# Patient Record
Sex: FEMALE | Race: ASIAN | NOT HISPANIC OR LATINO
[De-identification: names, ages, dates, MRNs, and addresses within clinical notes are randomized per-mention and may not be internally consistent; named-entity substitution may affect disease eponyms.]

---

## 2019-04-18 ENCOUNTER — RESULT REVIEW (OUTPATIENT)
Age: 34
End: 2019-04-18

## 2019-04-23 ENCOUNTER — TRANSCRIPTION ENCOUNTER (OUTPATIENT)
Age: 34
End: 2019-04-23

## 2020-05-07 ENCOUNTER — APPOINTMENT (OUTPATIENT)
Dept: OTOLARYNGOLOGY | Facility: CLINIC | Age: 35
End: 2020-05-07
Payer: COMMERCIAL

## 2020-05-07 VITALS
DIASTOLIC BLOOD PRESSURE: 78 MMHG | HEART RATE: 71 BPM | HEIGHT: 62 IN | WEIGHT: 120 LBS | BODY MASS INDEX: 22.08 KG/M2 | SYSTOLIC BLOOD PRESSURE: 114 MMHG

## 2020-05-07 DIAGNOSIS — Z78.9 OTHER SPECIFIED HEALTH STATUS: ICD-10-CM

## 2020-05-07 DIAGNOSIS — R09.89 OTHER SPECIFIED SYMPTOMS AND SIGNS INVOLVING THE CIRCULATORY AND RESPIRATORY SYSTEMS: ICD-10-CM

## 2020-05-07 PROCEDURE — 99204 OFFICE O/P NEW MOD 45 MIN: CPT | Mod: 25

## 2020-05-07 PROCEDURE — 42809 REMOVE PHARYNX FOREIGN BODY: CPT | Mod: RT

## 2020-05-07 NOTE — END OF VISIT
[FreeTextEntry3] : I personally saw and examined GIO DAVIS in detail.  I spoke to QUETA Marcano regarding the assessment and plan of care. I performed the procedures and relevant physical exam.  I have reviewed the above assessment and plan of care and I agree.  I have made changes to the body of the note wherever necessary and appropriate.

## 2020-05-07 NOTE — HISTORY OF PRESENT ILLNESS
[de-identified] : 33 y/o F, was eating sea bass today around 12:00pm and felt a bone get stuck on the right side of throat.  It has not moved.  Pos discomfort and odynophagia.  No SOB, no drooling.  Has not tried to eat or drink.

## 2020-05-07 NOTE — DATA REVIEWED
You may be receiving a patient experience survey by mail or e-mail from the Urgent Care Staff regarding your visit today.  We value your feedback and hope you can provide us your insight.    Patient Education     Tendonitis  A tendon is the thick fibrous cord that joins muscle to bone and allows joints to move. When a tendon becomes inflamed, it is called tendonitis. This can occur from overuse, injury, or infection. This usually involves the shoulders, forearm, wrist, hands and foot. Symptoms include pain, swelling and tenderness to the touch. Moving the joint increases the pain.  It takes 4 to 6 weeks for tendonitis to heal. It is treated by preventing motion of the tendon with a splint or brace and the use of anti-inflammatory medicine.  Home care  · Some people find relief with ice packs. These can be crushed or cubed ice in a plastic bag or a bag of frozen vegetables wrapped in a thin towel. Other people get better relief with heat. This can include a hot shower, hot bath, or a moist towel warmed in a microwave. Try each and use the method that feels best, for 15 to 20 minutes several times a day.  · Rest the inflamed joint and protect it from movement.  · You may use over-the-counter ibuprofen or naproxen to treat pain and inflammation, unless another medicine was prescribed. If you can't take these medicines, acetaminophen may help with the pain, but does not treat inflammation. If you have chronic liver or kidney disease or ever had a stomach ulcer or gastrointestinal bleeding, talk with your doctor before using these medicines.  · As your symptoms improve, begin gradual motion at the involved joint.  Follow-up care  Follow up with your healthcare provider if you are not improving after 5 days of treatment.  When to seek medical advice  Call your healthcare provider right away if any of these occur:  · Redness over the painful area  · Increasing pain or swelling at the joint  · Fever (1 degree above your  [de-identified] : xray reviewed from [facility]: no fb seen.  normal temperature) lasting 24 to 48 hours Or, whatever your healthcare provider told you to report based on your condition  © 7554-0483 [employer]. [address] [place] [address], [place] [address]. All rights reserved. This information is not intended as a substitute for professional medical care. Always follow your healthcare professional's instructions.         I would encourage you to use Aleve tablets (generic name is naproxen) he'll contact one or 2 tablets twice a day. This is available without a prescription. Patient you can use acetaminophen (Tylenol) 1000 mg 3-4 times daily.  If you have persistent symptoms or develop additional symptoms please see her primary care and follow-up

## 2020-05-07 NOTE — ASSESSMENT
[FreeTextEntry1] : Ms. DAVIS is a 34 year female with right tonsil foreign body (fish bone), removed in the office.  Much improved. \par - liquids only until cetacaine wears off. then soft diet for a few days\par - f/up as needed, call if there is any sensation of retained foreign body.

## 2020-05-07 NOTE — PHYSICAL EXAM
[Midline] : trachea located in midline position [Normal] : no rashes [de-identified] : area of trauma to right middle tonsil, when probed with q tip, edge of fish bone seen.

## 2020-05-07 NOTE — PROCEDURE
[FreeTextEntry1] : removal foreign body right tonsil [FreeTextEntry2] : retained fish bone right tonsil  [FreeTextEntry3] : risks, benefits and alternatives explained to the patient.  they agreed to proceed with procedure.  Consent signed.  Cetacaine used to spray right tonsil.  Area of previous trauma identified and probed with q tip, edge of fishbone seen.  Tongue depressor used to retract tongue and alligator forceps used to remove fish bone from the right tonsil. Minimal trauma noted.  patient with immediate relief.  No significant bleeding.

## 2020-06-16 ENCOUNTER — RESULT REVIEW (OUTPATIENT)
Age: 35
End: 2020-06-16

## 2021-06-21 ENCOUNTER — APPOINTMENT (OUTPATIENT)
Dept: ANTEPARTUM | Facility: CLINIC | Age: 36
End: 2021-06-21
Payer: COMMERCIAL

## 2021-06-21 ENCOUNTER — ASOB RESULT (OUTPATIENT)
Age: 36
End: 2021-06-21

## 2021-06-21 PROCEDURE — 99072 ADDL SUPL MATRL&STAF TM PHE: CPT

## 2021-06-21 PROCEDURE — 76813 OB US NUCHAL MEAS 1 GEST: CPT

## 2021-06-21 PROCEDURE — 76801 OB US < 14 WKS SINGLE FETUS: CPT

## 2021-08-17 ENCOUNTER — ASOB RESULT (OUTPATIENT)
Age: 36
End: 2021-08-17

## 2021-08-17 ENCOUNTER — TRANSCRIPTION ENCOUNTER (OUTPATIENT)
Age: 36
End: 2021-08-17

## 2021-08-17 ENCOUNTER — APPOINTMENT (OUTPATIENT)
Dept: ANTEPARTUM | Facility: CLINIC | Age: 36
End: 2021-08-17
Payer: COMMERCIAL

## 2021-08-17 ENCOUNTER — APPOINTMENT (OUTPATIENT)
Dept: PEDIATRIC CARDIOLOGY | Facility: CLINIC | Age: 36
End: 2021-08-17
Payer: COMMERCIAL

## 2021-08-17 PROCEDURE — 93325 DOPPLER ECHO COLOR FLOW MAPG: CPT | Mod: 59

## 2021-08-17 PROCEDURE — 76811 OB US DETAILED SNGL FETUS: CPT

## 2021-08-17 PROCEDURE — 99202 OFFICE O/P NEW SF 15 MIN: CPT

## 2021-08-17 PROCEDURE — 76827 ECHO EXAM OF FETAL HEART: CPT

## 2021-08-17 PROCEDURE — 76820 UMBILICAL ARTERY ECHO: CPT

## 2021-08-17 PROCEDURE — 76821 MIDDLE CEREBRAL ARTERY ECHO: CPT

## 2021-08-17 PROCEDURE — 76825 ECHO EXAM OF FETAL HEART: CPT

## 2021-09-21 ENCOUNTER — OUTPATIENT (OUTPATIENT)
Dept: OUTPATIENT SERVICES | Facility: HOSPITAL | Age: 36
LOS: 1 days | End: 2021-09-21
Payer: COMMERCIAL

## 2021-09-21 VITALS
DIASTOLIC BLOOD PRESSURE: 61 MMHG | HEART RATE: 81 BPM | RESPIRATION RATE: 15 BRPM | SYSTOLIC BLOOD PRESSURE: 106 MMHG | TEMPERATURE: 98 F

## 2021-09-21 VITALS — HEART RATE: 81 BPM | SYSTOLIC BLOOD PRESSURE: 106 MMHG | DIASTOLIC BLOOD PRESSURE: 61 MMHG

## 2021-09-21 VITALS — SYSTOLIC BLOOD PRESSURE: 111 MMHG | HEART RATE: 77 BPM | DIASTOLIC BLOOD PRESSURE: 67 MMHG

## 2021-09-21 DIAGNOSIS — Z3A.00 WEEKS OF GESTATION OF PREGNANCY NOT SPECIFIED: ICD-10-CM

## 2021-09-21 DIAGNOSIS — O26.899 OTHER SPECIFIED PREGNANCY RELATED CONDITIONS, UNSPECIFIED TRIMESTER: ICD-10-CM

## 2021-09-21 PROCEDURE — G0463: CPT

## 2021-09-21 PROCEDURE — 59025 FETAL NON-STRESS TEST: CPT

## 2021-09-21 NOTE — OB RN TRIAGE NOTE - NS_TRIAGEADDITIONAL COMMENTS_OBGYN_ALL_OB_FT
pt stated she has been up the last 3 nights due to insomnia  and noted pain " all over " and dec FM last night   pt notes that she feels less cramping but notes a dec in FM

## 2021-09-21 NOTE — OB PROVIDER TRIAGE NOTE - NSOBPROVIDERNOTE_OBGYN_ALL_OB_FT
37yo  @25w4d presents for evaluation for dec. FM.   - BPP , NST reactive  - d/c home  - kick precautions reviewed  - PTL precautions reviewed  - resume routine prenatal care    D/W Dr. Chris Valdivia, PGY-2 35yo  @25w4d presents for evaluation for dec. FM.   - BPP , NST reactive  - d/c home  - kick precautions reviewed  - PTL precautions reviewed  - resume routine prenatal care    D/W Dr. Chris Valdivia, PGY-2      attending addendum  Patient seen at bedside. No abdominal pain on exam. Patient is feeling baby move. Discussed measures to help her with sleep. Patient is in process of getting an appointment with  psych at [facility], but awaiting a call back. Patient stable for discharge home and to follow up in a week in the office    LETICIA Perez MD

## 2021-09-21 NOTE — OB PROVIDER TRIAGE NOTE - HISTORY OF PRESENT ILLNESS
35yo  @25w4d presents for evaluation for dec. FM. Patient reports dec. Fm since this AM and LUQ pain since last night. Patient has had insomnia for the past 3 days, and dec. appetite 2/2 lack of sleep. She reports she feels more movement now.   +FM. -VB. -LOF. -Ctx.    OBHx:   - PNC uncomplicated  - IVF pregnancy, transfer in April  GynHx: denies hx of fibroids/STDs/ovarian cysts/abnml pap smears  PMHx: denies  SHx: denies  Meds: PNVs, Vit C  All: NKDA

## 2021-09-24 PROBLEM — G47.00 INSOMNIA, UNSPECIFIED: Chronic | Status: ACTIVE | Noted: 2021-09-21

## 2021-10-19 ENCOUNTER — NON-APPOINTMENT (OUTPATIENT)
Age: 36
End: 2021-10-19

## 2021-10-20 ENCOUNTER — ASOB RESULT (OUTPATIENT)
Age: 36
End: 2021-10-20

## 2021-10-20 ENCOUNTER — APPOINTMENT (OUTPATIENT)
Dept: MATERNAL FETAL MEDICINE | Facility: CLINIC | Age: 36
End: 2021-10-20
Payer: COMMERCIAL

## 2021-10-20 DIAGNOSIS — O24.419 GESTATIONAL DIABETES MELLITUS IN PREGNANCY, UNSPECIFIED CONTROL: ICD-10-CM

## 2021-10-20 PROCEDURE — G0109 DIAB MANAGE TRN IND/GROUP: CPT | Mod: 95

## 2021-10-20 RX ORDER — LANCETS 33 GAUGE
EACH MISCELLANEOUS
Qty: 4 | Refills: 1 | Status: ACTIVE | COMMUNITY
Start: 2021-10-20 | End: 1900-01-01

## 2021-10-20 RX ORDER — URINE ACETONE TEST STRIPS
STRIP MISCELLANEOUS
Qty: 1 | Refills: 0 | Status: ACTIVE | COMMUNITY
Start: 2021-10-20 | End: 1900-01-01

## 2021-10-20 RX ORDER — BLOOD-GLUCOSE METER
W/DEVICE KIT MISCELLANEOUS
Qty: 1 | Refills: 0 | Status: ACTIVE | COMMUNITY
Start: 2021-10-20 | End: 1900-01-01

## 2021-10-20 RX ORDER — BLOOD-GLUCOSE METER
KIT MISCELLANEOUS 4 TIMES DAILY
Qty: 4 | Refills: 1 | Status: ACTIVE | COMMUNITY
Start: 2021-10-20 | End: 1900-01-01

## 2021-10-22 ENCOUNTER — NON-APPOINTMENT (OUTPATIENT)
Age: 36
End: 2021-10-22

## 2021-10-25 ENCOUNTER — APPOINTMENT (OUTPATIENT)
Dept: MATERNAL FETAL MEDICINE | Facility: CLINIC | Age: 36
End: 2021-10-25

## 2021-10-28 ENCOUNTER — ASOB RESULT (OUTPATIENT)
Age: 36
End: 2021-10-28

## 2021-10-28 ENCOUNTER — APPOINTMENT (OUTPATIENT)
Dept: ANTEPARTUM | Facility: CLINIC | Age: 36
End: 2021-10-28
Payer: COMMERCIAL

## 2021-10-28 PROCEDURE — 76820 UMBILICAL ARTERY ECHO: CPT

## 2021-10-28 PROCEDURE — 76819 FETAL BIOPHYS PROFIL W/O NST: CPT

## 2021-10-28 PROCEDURE — 76816 OB US FOLLOW-UP PER FETUS: CPT

## 2021-10-29 ENCOUNTER — APPOINTMENT (OUTPATIENT)
Dept: ANTEPARTUM | Facility: CLINIC | Age: 36
End: 2021-10-29

## 2021-10-29 ENCOUNTER — APPOINTMENT (OUTPATIENT)
Dept: MATERNAL FETAL MEDICINE | Facility: CLINIC | Age: 36
End: 2021-10-29
Payer: COMMERCIAL

## 2021-10-29 ENCOUNTER — ASOB RESULT (OUTPATIENT)
Age: 36
End: 2021-10-29

## 2021-10-29 PROCEDURE — G0108 DIAB MANAGE TRN  PER INDIV: CPT | Mod: 95

## 2021-11-12 ENCOUNTER — APPOINTMENT (OUTPATIENT)
Dept: MATERNAL FETAL MEDICINE | Facility: CLINIC | Age: 36
End: 2021-11-12
Payer: COMMERCIAL

## 2021-11-12 ENCOUNTER — ASOB RESULT (OUTPATIENT)
Age: 36
End: 2021-11-12

## 2021-11-12 PROCEDURE — G0108 DIAB MANAGE TRN  PER INDIV: CPT | Mod: 95

## 2021-11-22 ENCOUNTER — ASOB RESULT (OUTPATIENT)
Age: 36
End: 2021-11-22

## 2021-11-22 ENCOUNTER — APPOINTMENT (OUTPATIENT)
Dept: ANTEPARTUM | Facility: CLINIC | Age: 36
End: 2021-11-22
Payer: COMMERCIAL

## 2021-11-22 PROCEDURE — 76816 OB US FOLLOW-UP PER FETUS: CPT

## 2021-12-03 ENCOUNTER — APPOINTMENT (OUTPATIENT)
Dept: MATERNAL FETAL MEDICINE | Facility: CLINIC | Age: 36
End: 2021-12-03
Payer: COMMERCIAL

## 2021-12-03 ENCOUNTER — ASOB RESULT (OUTPATIENT)
Age: 36
End: 2021-12-03

## 2021-12-03 PROCEDURE — G0108 DIAB MANAGE TRN  PER INDIV: CPT | Mod: 95

## 2021-12-06 ENCOUNTER — APPOINTMENT (OUTPATIENT)
Dept: ANTEPARTUM | Facility: CLINIC | Age: 36
End: 2021-12-06
Payer: COMMERCIAL

## 2021-12-06 ENCOUNTER — ASOB RESULT (OUTPATIENT)
Age: 36
End: 2021-12-06

## 2021-12-06 PROCEDURE — 76819 FETAL BIOPHYS PROFIL W/O NST: CPT

## 2021-12-20 ENCOUNTER — APPOINTMENT (OUTPATIENT)
Dept: ANTEPARTUM | Facility: CLINIC | Age: 36
End: 2021-12-20
Payer: COMMERCIAL

## 2021-12-20 ENCOUNTER — ASOB RESULT (OUTPATIENT)
Age: 36
End: 2021-12-20

## 2021-12-20 PROCEDURE — 76816 OB US FOLLOW-UP PER FETUS: CPT

## 2021-12-20 PROCEDURE — 76819 FETAL BIOPHYS PROFIL W/O NST: CPT

## 2021-12-24 ENCOUNTER — OUTPATIENT (OUTPATIENT)
Dept: OUTPATIENT SERVICES | Facility: HOSPITAL | Age: 36
LOS: 1 days | End: 2021-12-24
Payer: COMMERCIAL

## 2021-12-24 VITALS
DIASTOLIC BLOOD PRESSURE: 60 MMHG | RESPIRATION RATE: 19 BRPM | OXYGEN SATURATION: 89 % | HEART RATE: 81 BPM | TEMPERATURE: 98 F | SYSTOLIC BLOOD PRESSURE: 115 MMHG

## 2021-12-24 DIAGNOSIS — Z3A.00 WEEKS OF GESTATION OF PREGNANCY NOT SPECIFIED: ICD-10-CM

## 2021-12-24 DIAGNOSIS — O26.899 OTHER SPECIFIED PREGNANCY RELATED CONDITIONS, UNSPECIFIED TRIMESTER: ICD-10-CM

## 2021-12-24 PROCEDURE — 76818 FETAL BIOPHYS PROFILE W/NST: CPT | Mod: 26

## 2021-12-24 PROCEDURE — 59025 FETAL NON-STRESS TEST: CPT

## 2021-12-24 PROCEDURE — G0463: CPT

## 2021-12-24 PROCEDURE — 99213 OFFICE O/P EST LOW 20 MIN: CPT

## 2021-12-24 NOTE — OB RN TRIAGE NOTE - NS_OBACUITY_OBGYN_ALL_OB_DT
JAD KUHN  : 1957 09:43:00  ACCOUNT:  [id number]  HOME PHONE:  [phone]  WORK PHONE:  [phone] CBC reviewed: Hgb 9.8, Hct 27.8 which is an improvement from hospital discharge.  Message left on the patient's cell phone.    Vanessa LAIRD     24-Dec-2021 15:59

## 2021-12-24 NOTE — OB PROVIDER TRIAGE NOTE - NSHPPHYSICALEXAM_GEN_ALL_CORE
T(C): 36.7 (12-24-21 @ 16:03), Max: 36.7 (12-24-21 @ 15:57)  HR: 84 (12-24-21 @ 16:47) (82 - 90)  BP: 120/73 (12-24-21 @ 16:05) (120/73 - 120/73)  RR: 19 (12-24-21 @ 16:05) (19 - 19)  SpO2: 94% (12-24-21 @ 16:47) (93% - 97%)  GEN:NAD  CV:RRR  Pulm: CTA bl  Abd soft NT gravid  FHT:   130  , mod john, + accels, - decels   TOCO:  no ctx  VE: deferred  SONO vtx BPP 8/8 abdirahman 12 anterior placenta

## 2021-12-24 NOTE — OB PROVIDER TRIAGE NOTE - NSOBPROVIDERNOTE_OBGYN_ALL_OB_FT
AP 35yo  @ 39weeks with decreased fetal movement which resolved, reactive NST  -efm/toco  -BPP done  -dc home  -appointment Monday  DW Dr Jarrod Jiménez PAC

## 2021-12-24 NOTE — OB RN TRIAGE NOTE - FALL HARM RISK - UNIVERSAL INTERVENTIONS
Spine appears normal, range of motion is not limited, no muscle or joint tenderness
Bed in lowest position, wheels locked, appropriate side rails in place/Call bell, personal items and telephone in reach/Instruct patient to call for assistance before getting out of bed or chair/Non-slip footwear when patient is out of bed/[place] to call system/Physically safe environment - no spills, clutter or unnecessary equipment/Purposeful Proactive Rounding/Room/bathroom lighting operational, light cord in reach

## 2021-12-24 NOTE — OB PROVIDER TRIAGE NOTE - HISTORY OF PRESENT ILLNESS
35yo  EDC 21 @ 39w reports decreased FM since earlier today. Pt states she currently feels "normal" movement. Denies ctx  lof  vb    GBS  negative  EFW 3200  PNC C/b GDMA1, fasting FS 60's, IVF pregnancy    OB:   GYN:Denies fibroids/ovarian cysts/STI's/abnl pap  PMH: none  PSH: none  MEDS: PNV  ALL:NKDA  Accepts blood. Denies h/o anxiety/depression.

## 2021-12-30 ENCOUNTER — ASOB RESULT (OUTPATIENT)
Age: 36
End: 2021-12-30

## 2021-12-30 ENCOUNTER — APPOINTMENT (OUTPATIENT)
Dept: ANTEPARTUM | Facility: CLINIC | Age: 36
End: 2021-12-30
Payer: COMMERCIAL

## 2021-12-30 PROBLEM — O24.419 GESTATIONAL DIABETES MELLITUS IN PREGNANCY, UNSPECIFIED CONTROL: Chronic | Status: ACTIVE | Noted: 2021-12-24

## 2021-12-30 PROCEDURE — 76819 FETAL BIOPHYS PROFIL W/O NST: CPT

## 2022-01-03 ENCOUNTER — ASOB RESULT (OUTPATIENT)
Age: 37
End: 2022-01-03

## 2022-01-03 ENCOUNTER — APPOINTMENT (OUTPATIENT)
Dept: ANTEPARTUM | Facility: CLINIC | Age: 37
End: 2022-01-03
Payer: COMMERCIAL

## 2022-01-03 PROCEDURE — 76819 FETAL BIOPHYS PROFIL W/O NST: CPT

## 2022-01-05 ENCOUNTER — INPATIENT (INPATIENT)
Facility: HOSPITAL | Age: 37
LOS: 2 days | Discharge: HOME | End: 2022-01-08
Attending: OBSTETRICS & GYNECOLOGY | Admitting: OBSTETRICS & GYNECOLOGY
Payer: COMMERCIAL

## 2022-01-05 ENCOUNTER — OUTPATIENT (OUTPATIENT)
Dept: OUTPATIENT SERVICES | Facility: HOSPITAL | Age: 37
LOS: 1 days | End: 2022-01-05
Payer: COMMERCIAL

## 2022-01-05 VITALS — TEMPERATURE: 99 F

## 2022-01-05 DIAGNOSIS — Z11.52 ENCOUNTER FOR SCREENING FOR COVID-19: ICD-10-CM

## 2022-01-05 DIAGNOSIS — N93.9 ABNORMAL UTERINE AND VAGINAL BLEEDING, UNSPECIFIED: ICD-10-CM

## 2022-01-05 DIAGNOSIS — O26.899 OTHER SPECIFIED PREGNANCY RELATED CONDITIONS, UNSPECIFIED TRIMESTER: ICD-10-CM

## 2022-01-05 DIAGNOSIS — Z34.80 ENCOUNTER FOR SUPERVISION OF OTHER NORMAL PREGNANCY, UNSPECIFIED TRIMESTER: ICD-10-CM

## 2022-01-05 DIAGNOSIS — Z3A.00 WEEKS OF GESTATION OF PREGNANCY NOT SPECIFIED: ICD-10-CM

## 2022-01-05 LAB
BASOPHILS # BLD AUTO: 0.03 K/UL — SIGNIFICANT CHANGE UP (ref 0–0.2)
BASOPHILS NFR BLD AUTO: 0.3 % — SIGNIFICANT CHANGE UP (ref 0–2)
BLD GP AB SCN SERPL QL: NEGATIVE — SIGNIFICANT CHANGE UP
EOSINOPHIL # BLD AUTO: 0.08 K/UL — SIGNIFICANT CHANGE UP (ref 0–0.5)
EOSINOPHIL NFR BLD AUTO: 0.9 % — SIGNIFICANT CHANGE UP (ref 0–6)
GLUCOSE BLDC GLUCOMTR-MCNC: 104 MG/DL — HIGH (ref 70–99)
GLUCOSE BLDC GLUCOMTR-MCNC: 81 MG/DL — SIGNIFICANT CHANGE UP (ref 70–99)
GLUCOSE BLDC GLUCOMTR-MCNC: 87 MG/DL — SIGNIFICANT CHANGE UP (ref 70–99)
HCT VFR BLD CALC: 37 % — SIGNIFICANT CHANGE UP (ref 34.5–45)
HGB BLD-MCNC: 12.7 G/DL — SIGNIFICANT CHANGE UP (ref 11.5–15.5)
IMM GRANULOCYTES NFR BLD AUTO: 0.4 % — SIGNIFICANT CHANGE UP (ref 0–1.5)
LYMPHOCYTES # BLD AUTO: 1.97 K/UL — SIGNIFICANT CHANGE UP (ref 1–3.3)
LYMPHOCYTES # BLD AUTO: 21.4 % — SIGNIFICANT CHANGE UP (ref 13–44)
MCHC RBC-ENTMCNC: 34.1 PG — HIGH (ref 27–34)
MCHC RBC-ENTMCNC: 34.3 GM/DL — SIGNIFICANT CHANGE UP (ref 32–36)
MCV RBC AUTO: 99.5 FL — SIGNIFICANT CHANGE UP (ref 80–100)
MONOCYTES # BLD AUTO: 0.51 K/UL — SIGNIFICANT CHANGE UP (ref 0–0.9)
MONOCYTES NFR BLD AUTO: 5.5 % — SIGNIFICANT CHANGE UP (ref 2–14)
NEUTROPHILS # BLD AUTO: 6.56 K/UL — SIGNIFICANT CHANGE UP (ref 1.8–7.4)
NEUTROPHILS NFR BLD AUTO: 71.5 % — SIGNIFICANT CHANGE UP (ref 43–77)
NRBC # BLD: 0 /100 WBCS — SIGNIFICANT CHANGE UP (ref 0–0)
PLATELET # BLD AUTO: 171 K/UL — SIGNIFICANT CHANGE UP (ref 150–400)
RBC # BLD: 3.72 M/UL — LOW (ref 3.8–5.2)
RBC # FLD: 12.1 % — SIGNIFICANT CHANGE UP (ref 10.3–14.5)
RH IG SCN BLD-IMP: POSITIVE — SIGNIFICANT CHANGE UP
SARS-COV-2 RNA SPEC QL NAA+PROBE: SIGNIFICANT CHANGE UP
T PALLIDUM AB TITR SER: NEGATIVE — SIGNIFICANT CHANGE UP
WBC # BLD: 9.19 K/UL — SIGNIFICANT CHANGE UP (ref 3.8–10.5)
WBC # FLD AUTO: 9.19 K/UL — SIGNIFICANT CHANGE UP (ref 3.8–10.5)

## 2022-01-05 PROCEDURE — C9803: CPT

## 2022-01-05 PROCEDURE — U0005: CPT

## 2022-01-05 PROCEDURE — U0003: CPT

## 2022-01-05 RX ORDER — CITRIC ACID/SODIUM CITRATE 300-500 MG
15 SOLUTION, ORAL ORAL EVERY 6 HOURS
Refills: 0 | Status: DISCONTINUED | OUTPATIENT
Start: 2022-01-05 | End: 2022-01-06

## 2022-01-05 RX ORDER — SODIUM CHLORIDE 9 MG/ML
1000 INJECTION, SOLUTION INTRAVENOUS
Refills: 0 | Status: DISCONTINUED | OUTPATIENT
Start: 2022-01-05 | End: 2022-01-05

## 2022-01-05 RX ORDER — SODIUM CHLORIDE 9 MG/ML
1000 INJECTION INTRAMUSCULAR; INTRAVENOUS; SUBCUTANEOUS
Refills: 0 | Status: DISCONTINUED | OUTPATIENT
Start: 2022-01-05 | End: 2022-01-08

## 2022-01-05 RX ORDER — OXYTOCIN 10 UNIT/ML
333.33 VIAL (ML) INJECTION
Qty: 20 | Refills: 0 | Status: DISCONTINUED | OUTPATIENT
Start: 2022-01-05 | End: 2022-01-08

## 2022-01-05 RX ORDER — SODIUM CHLORIDE 9 MG/ML
1000 INJECTION, SOLUTION INTRAVENOUS
Refills: 0 | Status: DISCONTINUED | OUTPATIENT
Start: 2022-01-05 | End: 2022-01-06

## 2022-01-05 NOTE — OB PROVIDER H&P - PROBLEM SELECTOR PLAN 1
-Admit  -Draw basic labs   -Sonogram to be performed  -Clear liquid diet   -IVF  -Buccal Cytotec, Bicitra, Pitocin infusion   -Cervical balloon  -FHR monitoring, uterine monitoring    COLT Brewer -Admit  -Draw basic labs   -Clear liquid diet   -IVF  -Buccal Cytotec, Bicitra   -Cervical balloon after few doses.   -FHR monitoring, uterine monitoring    JINNY Brewer-S    Agree w/ above. Pt in early labor vs. possible abruption though suspicion low given benign abdominal exam and FH  R cat 1. Will induce labor given post-term with vaginal bleeding.   d/w Dr. Ela Floyd

## 2022-01-05 NOTE — OB PROVIDER H&P - HISTORY OF PRESENT ILLNESS
35 y/o  @40w5d (BO:21) presents d/t vaginal bleeding. Pt states that bleeding began at 12am, described as "dark red". Reports clots and heavy bleeding continuing into this AM where she went through 2 pads. States that last sono was done at clinic 1/3, unremarkable. Denies abdominal pain, N/V/D. Also admits to hematuria, denies burning, frequency, urgency, or suprapubic pain. Denies contractions, LOF, +FM. Denies headache, dizziness, blurry vision, chest pain, SOB. GBS negative. EFW: 3175     OBHx: Denies  GYNHx: Denies h/o polyps, cysts, fibroids, abnml paps, STDs/STIs  PMH: gDM A1  PSHx: Denies pertinent surgical history   FHx: Denies family h/o bleeding or clotting disorders  Social Hx: Denies alcohol, smoking or drug use during pregnancy   Allergies: NKDA, NKFA, NKEA  Meds: PNV, vit C and D, iron, Ca+2 supplementations  35 y/o  @40w5d (BO:21) presents d/t vaginal bleeding. Pt states that bleeding began at 12am, described as "dark red". Reports clots and heavy bleeding continuing into this AM where she went through 2 pads. States that last sono was done at clinic 1/3, unremarkable. Denies abdominal pain, N/V/D. Also admits to hematuria, denies burning, frequency, urgency, or suprapubic pain. Denies contractions, LOF, +FM. Denies headache, dizziness, blurry vision, chest pain, SOB. GBS negative. EFW: 3175   PNC c/b GDMA1.     OBHx: Denies  GYNHx: Denies h/o polyps, cysts, fibroids, abnml paps, STDs/STIs  PSHx: Denies pertinent surgical history   FHx: Denies family h/o bleeding or clotting disorders  Social Hx: Denies alcohol, smoking or drug use during pregnancy   Allergies: NKDA, NKFA, NKEA  Meds: PNV, vit C and D, iron, Ca+2 supplementations

## 2022-01-05 NOTE — OB RN PATIENT PROFILE - FUNCTIONAL ASSESSMENT - BASIC MOBILITY 1.
Normal rate, regular rhythm.  Heart sounds S1, S2.  No murmurs, rubs or gallops.
4 = No assist / stand by assistance

## 2022-01-05 NOTE — OB PROVIDER H&P - ATTENDING COMMENTS
Patient seen and examined, agree with above. Will admit for IOL for VB of unknown etiology, currently stable and fetal status and clinical exam reassuring.

## 2022-01-05 NOTE — OB PROVIDER H&P - ASSESSMENT
A: 37 y/o  @40w5d (BO:) presents d/t vaginal bleeding.   -Probable result of cervical changes   /-3   -R/o abruption  unlikely d/t contractions q 10 mins, category 1 tracing, no reported abd pain

## 2022-01-05 NOTE — CHART NOTE - NSCHARTNOTEFT_GEN_A_CORE
OB PA  Note    Pt evaluated at bedside for placement of cervical balloon. No current concerns    ICU Vital Signs Last 24 Hrs  T(C): 37.0 (05 Jan 2022 16:36), Max: 37.2 (05 Jan 2022 10:56)  T(F): 98.6 (05 Jan 2022 16:36), Max: 98.96 (05 Jan 2022 10:56)  HR: 80 (05 Jan 2022 17:54) (76 - 95)  BP: 128/81 (05 Jan 2022 16:36) (119/70 - 128/81)  BP(mean): --  ABP: --  ABP(mean): --  RR: 16 (05 Jan 2022 12:42) (16 - 16)  SpO2: 100% (05 Jan 2022 17:54) (96% - 100%)      Gen: NAD    VE 1/20/-3. cervical balloon placed under indirect visualization. Intrauterine/intravaginal balloons filled with 60cc sterile saline each. Pt tolerated procedure well    FHT cat 1  Azure irregular    Plan:  s/p cervical balloon placement  continue current management    JINNY Diane

## 2022-01-05 NOTE — OB PROVIDER IHI INDUCTION/AUGMENTATION NOTE - NS_FINALEDD_OBGYN_ALL_OB_DT
"-- DO NOT REPLY / DO NOT REPLY ALL --  -- Message is from the [facility]--    General Patient Message      Reason for Call: the patient went to the Urgent Care on This past Monday. The Pharmacy said  have not received the medication doxycycline hyclate (VIBRAMYCIN) 100 MG capsule . The pharnacy said they don't have any medication doxycycline hyclate (VIBRAMYCIN) 100 MG capsule  for this patient. Please resend the medication over asap. Caller Information       Type Contact Phone    12/30/2021 09:03 AM CST Phone (Incoming) Javier Medel (Self) [phone] (M)          Alternative phone number: none    Turnaround time given to caller: ""This message will be sent to [facility] Provider's name]. The clinical team will fulfill your request as soon as they review your message. \""    " 31-Dec-2021

## 2022-01-05 NOTE — OB PROVIDER H&P - NSHPPHYSICALEXAM_GEN_ALL_CORE
PHYSICAL EXAM:    Vital Signs Last 24 Hrs  T(C): 37.2 (05 Jan 2022 10:56), Max: 37.2 (05 Jan 2022 10:56)  T(F): 98.96 (05 Jan 2022 10:56), Max: 98.96 (05 Jan 2022 10:56)  HR: 85 (05 Jan 2022 12:13) (81 - 92)  BP: 119/70 (05 Jan 2022 10:58) (119/70 - 119/70)  BP(mean): --  RR: --  SpO2: 97% (05 Jan 2022 12:13) (96% - 98%)    GENERAL: Pt lying comfortably, NAD.  CHEST/LUNG: Clear to auscultation bilaterally; No wheezing, crackles, rhonchi.   HEART: S1S2+, Regular rate and rhythm; No murmurs, gallops, rubs.   ABDOMEN: Soft, Nontender, Bowel sounds present.  VAGINAL: 1/60/-3  EXT: No edema or calf tenderness   PSYCH: Normal mood. PHYSICAL EXAM:    Vital Signs Last 24 Hrs  T(C): 37.2 (05 Jan 2022 10:56), Max: 37.2 (05 Jan 2022 10:56)  T(F): 98.96 (05 Jan 2022 10:56), Max: 98.96 (05 Jan 2022 10:56)  HR: 85 (05 Jan 2022 12:13) (81 - 92)  BP: 119/70 (05 Jan 2022 10:58) (119/70 - 119/70)  BP(mean): --  RR: --  SpO2: 97% (05 Jan 2022 12:13) (96% - 98%)    GENERAL: Pt lying comfortably, NAD.  CHEST/LUNG: Clear to auscultation bilaterally; No wheezing, crackles, rhonchi.   HEART: S1S2+, Regular rate and rhythm; No murmurs, gallops, rubs.   ABDOMEN: Soft, Nontender, Bowel sounds present.  SSE: Os visualized - appears 1 cm dilated. Dark brown/red mucus/blood noted, approx 2cc. No active bleeding.   VAGINAL: 1/60/-3  EXT: No edema or calf tenderness   PSYCH: Normal mood.

## 2022-01-06 LAB
COVID-19 SPIKE DOMAIN AB INTERP: POSITIVE
COVID-19 SPIKE DOMAIN ANTIBODY RESULT: 209 U/ML — HIGH
GLUCOSE BLDC GLUCOMTR-MCNC: 105 MG/DL — HIGH (ref 70–99)
GLUCOSE BLDC GLUCOMTR-MCNC: 109 MG/DL — HIGH (ref 70–99)
GLUCOSE BLDC GLUCOMTR-MCNC: 77 MG/DL — SIGNIFICANT CHANGE UP (ref 70–99)
GLUCOSE BLDC GLUCOMTR-MCNC: 82 MG/DL — SIGNIFICANT CHANGE UP (ref 70–99)
GLUCOSE BLDC GLUCOMTR-MCNC: 85 MG/DL — SIGNIFICANT CHANGE UP (ref 70–99)
GLUCOSE BLDC GLUCOMTR-MCNC: 88 MG/DL — SIGNIFICANT CHANGE UP (ref 70–99)
GLUCOSE BLDC GLUCOMTR-MCNC: 89 MG/DL — SIGNIFICANT CHANGE UP (ref 70–99)
GLUCOSE BLDC GLUCOMTR-MCNC: 91 MG/DL — SIGNIFICANT CHANGE UP (ref 70–99)
GLUCOSE BLDC GLUCOMTR-MCNC: 92 MG/DL — SIGNIFICANT CHANGE UP (ref 70–99)
GLUCOSE BLDC GLUCOMTR-MCNC: 97 MG/DL — SIGNIFICANT CHANGE UP (ref 70–99)
SARS-COV-2 IGG+IGM SERPL QL IA: 209 U/ML — HIGH
SARS-COV-2 IGG+IGM SERPL QL IA: POSITIVE

## 2022-01-06 RX ORDER — SODIUM CHLORIDE 9 MG/ML
1000 INJECTION, SOLUTION INTRAVENOUS
Refills: 0 | Status: DISCONTINUED | OUTPATIENT
Start: 2022-01-06 | End: 2022-01-08

## 2022-01-06 RX ORDER — LANOLIN
1 OINTMENT (GRAM) TOPICAL EVERY 6 HOURS
Refills: 0 | Status: DISCONTINUED | OUTPATIENT
Start: 2022-01-06 | End: 2022-01-08

## 2022-01-06 RX ORDER — ACETAMINOPHEN 500 MG
975 TABLET ORAL
Refills: 0 | Status: DISCONTINUED | OUTPATIENT
Start: 2022-01-06 | End: 2022-01-08

## 2022-01-06 RX ORDER — MAGNESIUM HYDROXIDE 400 MG/1
30 TABLET, CHEWABLE ORAL
Refills: 0 | Status: DISCONTINUED | OUTPATIENT
Start: 2022-01-06 | End: 2022-01-08

## 2022-01-06 RX ORDER — OXYCODONE HYDROCHLORIDE 5 MG/1
5 TABLET ORAL ONCE
Refills: 0 | Status: DISCONTINUED | OUTPATIENT
Start: 2022-01-06 | End: 2022-01-08

## 2022-01-06 RX ORDER — OXYTOCIN 10 UNIT/ML
2 VIAL (ML) INJECTION
Qty: 30 | Refills: 0 | Status: DISCONTINUED | OUTPATIENT
Start: 2022-01-06 | End: 2022-01-08

## 2022-01-06 RX ORDER — KETOROLAC TROMETHAMINE 30 MG/ML
30 SYRINGE (ML) INJECTION ONCE
Refills: 0 | Status: DISCONTINUED | OUTPATIENT
Start: 2022-01-06 | End: 2022-01-06

## 2022-01-06 RX ORDER — AMPICILLIN SODIUM AND SULBACTAM SODIUM 250; 125 MG/ML; MG/ML
3 INJECTION, POWDER, FOR SUSPENSION INTRAMUSCULAR; INTRAVENOUS ONCE
Refills: 0 | Status: COMPLETED | OUTPATIENT
Start: 2022-01-06 | End: 2022-01-06

## 2022-01-06 RX ORDER — SODIUM CHLORIDE 9 MG/ML
3 INJECTION INTRAMUSCULAR; INTRAVENOUS; SUBCUTANEOUS EVERY 8 HOURS
Refills: 0 | Status: DISCONTINUED | OUTPATIENT
Start: 2022-01-06 | End: 2022-01-08

## 2022-01-06 RX ORDER — AMPICILLIN SODIUM AND SULBACTAM SODIUM 250; 125 MG/ML; MG/ML
INJECTION, POWDER, FOR SUSPENSION INTRAMUSCULAR; INTRAVENOUS
Refills: 0 | Status: DISCONTINUED | OUTPATIENT
Start: 2022-01-06 | End: 2022-01-06

## 2022-01-06 RX ORDER — OXYCODONE HYDROCHLORIDE 5 MG/1
5 TABLET ORAL
Refills: 0 | Status: DISCONTINUED | OUTPATIENT
Start: 2022-01-06 | End: 2022-01-08

## 2022-01-06 RX ORDER — BENZOCAINE 10 %
1 GEL (GRAM) MUCOUS MEMBRANE EVERY 6 HOURS
Refills: 0 | Status: DISCONTINUED | OUTPATIENT
Start: 2022-01-06 | End: 2022-01-08

## 2022-01-06 RX ORDER — DIPHENHYDRAMINE HCL 50 MG
25 CAPSULE ORAL ONCE
Refills: 0 | Status: COMPLETED | OUTPATIENT
Start: 2022-01-06 | End: 2022-01-08

## 2022-01-06 RX ORDER — SIMETHICONE 80 MG/1
80 TABLET, CHEWABLE ORAL EVERY 4 HOURS
Refills: 0 | Status: DISCONTINUED | OUTPATIENT
Start: 2022-01-06 | End: 2022-01-08

## 2022-01-06 RX ORDER — PRAMOXINE HYDROCHLORIDE 150 MG/15G
1 AEROSOL, FOAM RECTAL EVERY 4 HOURS
Refills: 0 | Status: DISCONTINUED | OUTPATIENT
Start: 2022-01-06 | End: 2022-01-08

## 2022-01-06 RX ORDER — DIBUCAINE 1 %
1 OINTMENT (GRAM) RECTAL EVERY 6 HOURS
Refills: 0 | Status: DISCONTINUED | OUTPATIENT
Start: 2022-01-06 | End: 2022-01-08

## 2022-01-06 RX ORDER — ACETAMINOPHEN 500 MG
1000 TABLET ORAL ONCE
Refills: 0 | Status: COMPLETED | OUTPATIENT
Start: 2022-01-06 | End: 2022-01-06

## 2022-01-06 RX ORDER — TETANUS TOXOID, REDUCED DIPHTHERIA TOXOID AND ACELLULAR PERTUSSIS VACCINE, ADSORBED 5; 2.5; 8; 8; 2.5 [IU]/.5ML; [IU]/.5ML; UG/.5ML; UG/.5ML; UG/.5ML
0.5 SUSPENSION INTRAMUSCULAR ONCE
Refills: 0 | Status: DISCONTINUED | OUTPATIENT
Start: 2022-01-06 | End: 2022-01-08

## 2022-01-06 RX ORDER — OXYTOCIN 10 UNIT/ML
333.33 VIAL (ML) INJECTION
Qty: 20 | Refills: 0 | Status: DISCONTINUED | OUTPATIENT
Start: 2022-01-06 | End: 2022-01-08

## 2022-01-06 RX ORDER — AER TRAVELER 0.5 G/1
1 SOLUTION RECTAL; TOPICAL EVERY 4 HOURS
Refills: 0 | Status: DISCONTINUED | OUTPATIENT
Start: 2022-01-06 | End: 2022-01-08

## 2022-01-06 RX ORDER — IBUPROFEN 200 MG
600 TABLET ORAL EVERY 6 HOURS
Refills: 0 | Status: COMPLETED | OUTPATIENT
Start: 2022-01-06 | End: 2022-12-05

## 2022-01-06 RX ORDER — HYDROCORTISONE 1 %
1 OINTMENT (GRAM) TOPICAL EVERY 6 HOURS
Refills: 0 | Status: DISCONTINUED | OUTPATIENT
Start: 2022-01-06 | End: 2022-01-08

## 2022-01-06 RX ORDER — DIPHENHYDRAMINE HCL 50 MG
25 CAPSULE ORAL EVERY 6 HOURS
Refills: 0 | Status: DISCONTINUED | OUTPATIENT
Start: 2022-01-06 | End: 2022-01-08

## 2022-01-06 RX ADMIN — Medication 2 MILLIUNIT(S)/MIN: at 07:49

## 2022-01-06 RX ADMIN — Medication 400 MILLIGRAM(S): at 18:55

## 2022-01-06 RX ADMIN — AMPICILLIN SODIUM AND SULBACTAM SODIUM 200 GRAM(S): 250; 125 INJECTION, POWDER, FOR SUSPENSION INTRAMUSCULAR; INTRAVENOUS at 19:14

## 2022-01-06 RX ADMIN — Medication 30 MILLIGRAM(S): at 23:26

## 2022-01-06 NOTE — OB RN DELIVERY SUMMARY - NS_LABORCHARACTER_OBGYN_ALL_OB
Induction of labor-AROM/Induction of labor-Medicinal/Febrile (>38C)/External electronic FM/Antibiotics in labor

## 2022-01-06 NOTE — OB PROVIDER LABOR PROGRESS NOTE - ASSESSMENT
P0 undergoing LT iol. Patient also has GDMA1. Making change on pitocin    - continue pit  - peanut ball  - epidural top off  - rotating fluids per protocol    LETICIA Perez MD
P0 undergoing late term iol    - will labor down  - will teach patient how to push    T Chris AMANDA
P0 undergoing iol at 40w6d, with GDMA1. Making change on pitiocin, AROM clear fluid    - continue pitocin   - rotating fluids per protocol    LETICIA Perez MD  
P0 undergoing late term iol  - IUPC placed  - restart pitocin  - continue current management     LETICIA Perez MD
- CB removed with light traction   - Pt currently little too frequently for po    Sarah Robles, PGY1

## 2022-01-06 NOTE — OB RN DELIVERY SUMMARY - NS_SEPSISRSKCALC_OBGYN_ALL_OB_FT
EOS calculated successfully. EOS Risk Factor: 0.5/1000 live births ([facility] national incidence); GA=40w6d; Temp=101.3; ROM=11.983; GBS='Negative'; Antibiotics='Broad spectrum antibiotics 2-3.9 hrs prior to birth'

## 2022-01-06 NOTE — OB RN DELIVERY SUMMARY - NSSELHIDDEN_OBGYN_ALL_OB_FT
[NS_DeliveryAttending1_OBGYN_ALL_OB_FT:[id number]=],[NS_DeliveryAssist1_OBGYN_ALL_OB_FT:[id number]=],[NS_DeliveryRN_OBGYN_ALL_OB_FT:[id number]] [NS_DeliveryAttending1_OBGYN_ALL_OB_FT:[id number]],[NS_DeliveryAssist1_OBGYN_ALL_OB_FT:[id number]],[NS_DeliveryRN_OBGYN_ALL_OB_FT:[id number]=],[NS_CirculateRN2_OBGYN_ALL_OB_FT:[id number]]

## 2022-01-06 NOTE — OB PROVIDER LABOR PROGRESS NOTE - NS_SUBJECTIVE/OBJECTIVE_OBGYN_ALL_OB_FT
patient examined for cervical change
patient examined to check progress in labor
VE to evaluate progress in labor
patient examined due to increased pressure
patient examined for cervical change. Patient had epidural redone due to inadequate pain relief, still has some contraction pain.

## 2022-01-06 NOTE — OB PROVIDER DELIVERY SUMMARY - NSPROVIDERDELIVERYNOTE_OBGYN_ALL_OB_FT
RML created. Spontaneous vaginal delivery of liveborn infant from OA position. Head, shoulders and body were delivered easily. Infant was suctioned. No Mec. Delayed cord clamping performed. Cord was clamped and cut and infant was passed to mother. Placenta was delivered intact with 3 vessel cord. Fundal massage was given and uterine fundus was found to be firm. Vaginal exam revealed an intact cervix, vaginal walls and sulci. Patient had a 2nd degree laceration in the perineum and RML that were repaired with 2.0  and 3.0 vicryl suture. Excellent hemostasis was noted. Patient stable. Count was correct x2.    Sarah Robles, PGY1 RML created. Spontaneous vaginal delivery of liveborn infant from OA position. Nuchal x 2, reduced. Head, shoulders and body were delivered easily. Infant was suctioned. No Mec. Delayed cord clamping performed. Cord was clamped and cut and infant was passed to mother. Placenta was delivered intact with 3 vessel cord. Fundal massage was given and uterine fundus was found to be firm. Vaginal exam revealed an intact cervix, vaginal walls and sulci. Patient had a 2nd degree laceration in the perineum and RML that were repaired with 2.0  and 3.0 vicryl suture. Excellent hemostasis was noted. Patient stable. Count was correct x2.    Sarah Robles, PGY1

## 2022-01-07 LAB
BASOPHILS # BLD AUTO: 0.05 K/UL — SIGNIFICANT CHANGE UP (ref 0–0.2)
BASOPHILS NFR BLD AUTO: 0.2 % — SIGNIFICANT CHANGE UP (ref 0–2)
EOSINOPHIL # BLD AUTO: 0.04 K/UL — SIGNIFICANT CHANGE UP (ref 0–0.5)
EOSINOPHIL NFR BLD AUTO: 0.2 % — SIGNIFICANT CHANGE UP (ref 0–6)
HCT VFR BLD CALC: 29.6 % — LOW (ref 34.5–45)
HCT VFR BLD CALC: 30.9 % — LOW (ref 34.5–45)
HGB BLD-MCNC: 10.2 G/DL — LOW (ref 11.5–15.5)
HGB BLD-MCNC: 10.9 G/DL — LOW (ref 11.5–15.5)
IMM GRANULOCYTES NFR BLD AUTO: 0.6 % — SIGNIFICANT CHANGE UP (ref 0–1.5)
LYMPHOCYTES # BLD AUTO: 1.51 K/UL — SIGNIFICANT CHANGE UP (ref 1–3.3)
LYMPHOCYTES # BLD AUTO: 6.9 % — LOW (ref 13–44)
MCHC RBC-ENTMCNC: 33.8 PG — SIGNIFICANT CHANGE UP (ref 27–34)
MCHC RBC-ENTMCNC: 34.4 PG — HIGH (ref 27–34)
MCHC RBC-ENTMCNC: 34.5 GM/DL — SIGNIFICANT CHANGE UP (ref 32–36)
MCHC RBC-ENTMCNC: 35.3 GM/DL — SIGNIFICANT CHANGE UP (ref 32–36)
MCV RBC AUTO: 97.5 FL — SIGNIFICANT CHANGE UP (ref 80–100)
MCV RBC AUTO: 98 FL — SIGNIFICANT CHANGE UP (ref 80–100)
MONOCYTES # BLD AUTO: 0.85 K/UL — SIGNIFICANT CHANGE UP (ref 0–0.9)
MONOCYTES NFR BLD AUTO: 3.9 % — SIGNIFICANT CHANGE UP (ref 2–14)
NEUTROPHILS # BLD AUTO: 19.16 K/UL — HIGH (ref 1.8–7.4)
NEUTROPHILS NFR BLD AUTO: 88.2 % — HIGH (ref 43–77)
NRBC # BLD: 0 /100 WBCS — SIGNIFICANT CHANGE UP (ref 0–0)
NRBC # BLD: 0 /100 WBCS — SIGNIFICANT CHANGE UP (ref 0–0)
PLATELET # BLD AUTO: 121 K/UL — LOW (ref 150–400)
PLATELET # BLD AUTO: 124 K/UL — LOW (ref 150–400)
RBC # BLD: 3.02 M/UL — LOW (ref 3.8–5.2)
RBC # BLD: 3.17 M/UL — LOW (ref 3.8–5.2)
RBC # FLD: 12.1 % — SIGNIFICANT CHANGE UP (ref 10.3–14.5)
RBC # FLD: 12.3 % — SIGNIFICANT CHANGE UP (ref 10.3–14.5)
WBC # BLD: 18.69 K/UL — HIGH (ref 3.8–10.5)
WBC # BLD: 21.74 K/UL — HIGH (ref 3.8–10.5)
WBC # FLD AUTO: 18.69 K/UL — HIGH (ref 3.8–10.5)
WBC # FLD AUTO: 21.74 K/UL — HIGH (ref 3.8–10.5)

## 2022-01-07 RX ORDER — IBUPROFEN 200 MG
600 TABLET ORAL EVERY 6 HOURS
Refills: 0 | Status: DISCONTINUED | OUTPATIENT
Start: 2022-01-07 | End: 2022-01-08

## 2022-01-07 RX ORDER — BENZOCAINE AND MENTHOL 5; 1 G/100ML; G/100ML
1 LIQUID ORAL THREE TIMES A DAY
Refills: 0 | Status: DISCONTINUED | OUTPATIENT
Start: 2022-01-07 | End: 2022-01-08

## 2022-01-07 RX ORDER — LORATADINE 10 MG/1
10 TABLET ORAL DAILY
Refills: 0 | Status: DISCONTINUED | OUTPATIENT
Start: 2022-01-07 | End: 2022-01-08

## 2022-01-07 RX ADMIN — Medication 600 MILLIGRAM(S): at 17:51

## 2022-01-07 RX ADMIN — Medication 600 MILLIGRAM(S): at 12:38

## 2022-01-07 RX ADMIN — Medication 975 MILLIGRAM(S): at 09:53

## 2022-01-07 RX ADMIN — BENZOCAINE AND MENTHOL 1 LOZENGE: 5; 1 LIQUID ORAL at 09:52

## 2022-01-07 RX ADMIN — LORATADINE 10 MILLIGRAM(S): 10 TABLET ORAL at 12:37

## 2022-01-07 RX ADMIN — BENZOCAINE AND MENTHOL 1 LOZENGE: 5; 1 LIQUID ORAL at 23:49

## 2022-01-07 RX ADMIN — Medication 975 MILLIGRAM(S): at 15:38

## 2022-01-07 RX ADMIN — Medication 1 TABLET(S): at 12:38

## 2022-01-07 RX ADMIN — Medication 600 MILLIGRAM(S): at 18:55

## 2022-01-07 RX ADMIN — SODIUM CHLORIDE 3 MILLILITER(S): 9 INJECTION INTRAMUSCULAR; INTRAVENOUS; SUBCUTANEOUS at 23:50

## 2022-01-07 RX ADMIN — Medication 975 MILLIGRAM(S): at 10:30

## 2022-01-07 RX ADMIN — SODIUM CHLORIDE 3 MILLILITER(S): 9 INJECTION INTRAMUSCULAR; INTRAVENOUS; SUBCUTANEOUS at 15:39

## 2022-01-07 RX ADMIN — Medication 600 MILLIGRAM(S): at 13:20

## 2022-01-07 RX ADMIN — Medication 975 MILLIGRAM(S): at 21:15

## 2022-01-07 RX ADMIN — Medication 600 MILLIGRAM(S): at 05:30

## 2022-01-07 RX ADMIN — Medication 975 MILLIGRAM(S): at 16:13

## 2022-01-07 RX ADMIN — Medication 975 MILLIGRAM(S): at 21:50

## 2022-01-07 RX ADMIN — Medication 600 MILLIGRAM(S): at 06:15

## 2022-01-07 NOTE — CHART NOTE - NSCHARTNOTEFT_GEN_A_CORE
Patient seen for: nutrition consult for GDM postpartum education prior to discharge    Source: patient, EMR    Patient is a 36y female,  @40w5d presents vaginal bleeding. PPD #1, s/p .    Admission date: 22  Antepartum course significant for GDMA1.  Pt plans on breastfeeding infant: Yes    Chart reviewed, events noted. Meds/Labs reviewed.    Anthropometrics:  Height: 62 inches  Pre-pregnancy weight: 110 pounds   Weight gain: 31.9 pounds   Admit/Dosing wt: 141.9 pounds     Nutrition Diagnosis:   Food and Nutrition Related Knowledge Deficit related to knowledge of post-partum GDM nutrition recommendations as evidenced by Pt first pregnancy, needed GDM education.      Goal: Pt able to teach back 2 points of nutrition education provided.     Nutrition Intervention:  Post-partum GDM diet education provided to patient and  at bedside:   1) Increased risk of developing T2DM with GDM and ways to help prevent development  2) 4-12 week fasting oral glucose tolerance test follow-up as outpatient  3) General healthful diet and physical activity recommendations discussed  4) Increased energy needs with breastfeeding    After-delivery GDM education handout provided.      Monitoring:  No other nutrition risks were identified during this encounter.  RD remains available upon request and will follow up per protocol.    Debra Gleason, Dietetic Intern Patient seen for: nutrition consult for GDM postpartum education prior to discharge    Source: patient, EMR    Patient is a 36y female PPD #1, s/p     Admission date: 22  Antepartum course significant for GDMA1.  Pt plans on breastfeeding infant: Yes    Chart reviewed, events noted. Meds/Labs reviewed.    Anthropometrics:  Height: 62 inches  Pre-pregnancy weight: 110 pounds   Weight gain: 31.9 pounds   Admit/Dosing wt: 141.9 pounds     Nutrition Diagnosis:   Food and Nutrition Related Knowledge Deficit related to knowledge of post-partum GDM nutrition recommendations as evidenced by Pt first pregnancy, needed GDM education.  Goal: Pt able to teach back 2 points of nutrition education provided.     Nutrition Intervention:  Post-partum GDM diet education provided to patient and  at bedside:   1) Increased risk of developing T2DM with GDM and ways to help prevent development  2) 4-12 week fasting oral glucose tolerance test follow-up as outpatient  3) General healthful diet and physical activity recommendations discussed  4) Increased energy needs with breastfeeding    After-delivery GDM education handout provided.      Monitoring:  No other nutrition risks were identified during this encounter.  RD remains available upon request and will follow up per protocol.    Debra Gleason, Dietetic Intern

## 2022-01-07 NOTE — PROGRESS NOTE ADULT - ASSESSMENT
A/P:  36y  PPD # 1      S/P                      doing well      Current Issues: sore throat  PAST MEDICAL & SURGICAL HISTORY:  Insomnia    Gestational diabetes    No significant past surgical history A/P:  36y  PPD # 1      S/P                      doing well      Current Issues: sore throat  IPT, afebrile, continue to monitor vitals  PAST MEDICAL & SURGICAL HISTORY:  Insomnia    Gestational diabetes    No significant past surgical history

## 2022-01-07 NOTE — PROGRESS NOTE ADULT - SUBJECTIVE AND OBJECTIVE BOX
Postpartum Note- PPD#1    Allergies    No Known Allergies    Intolerances    Blood Type AB   Positive    RPR : Negative    Rubella: Immune    S:Patient is a  36y      P 1     PPD#1         S/P     Patient c/o sore throat since delivery, abdominal pain is controlled with PO meds.    Pt is OOB, tolerating PO, passing flatus. Lochia WNL.     Feeding: Bottle    O:  Vital Signs Last 24 Hrs  T(C): 36.7 (2022 05:00), Max: 38.5 (2022 18:43)  T(F): 98 (2022 05:00), Max: 101.3 (2022 18:43)  HR: 81 (2022 05:00) (61 - 153)  BP: 118/71 (2022 05:00) (96/52 - 190/117)  RR: 17 (2022 05:00) (17 - 18)  SpO2: 96% (2022 05:00) (81% - 100%)     Gen: NAD  Abdomen: Soft, nontender, non-distended, fundus firm.  Vaginal: Lochia WNL  Ext: Neg calf tenderness    LABS:    Hemoglobin: 12.7 g/dL ( @ 13:30)      Hematocrit: 37.0 % ( @ 13:30)

## 2022-01-08 ENCOUNTER — TRANSCRIPTION ENCOUNTER (OUTPATIENT)
Age: 37
End: 2022-01-08

## 2022-01-08 VITALS
DIASTOLIC BLOOD PRESSURE: 76 MMHG | HEART RATE: 77 BPM | OXYGEN SATURATION: 97 % | RESPIRATION RATE: 18 BRPM | SYSTOLIC BLOOD PRESSURE: 113 MMHG | TEMPERATURE: 98 F

## 2022-01-08 LAB
BASOPHILS # BLD AUTO: 0.04 K/UL — SIGNIFICANT CHANGE UP (ref 0–0.2)
BASOPHILS NFR BLD AUTO: 0.3 % — SIGNIFICANT CHANGE UP (ref 0–2)
EOSINOPHIL # BLD AUTO: 0.23 K/UL — SIGNIFICANT CHANGE UP (ref 0–0.5)
EOSINOPHIL NFR BLD AUTO: 1.7 % — SIGNIFICANT CHANGE UP (ref 0–6)
HCT VFR BLD CALC: 26.9 % — LOW (ref 34.5–45)
HGB BLD-MCNC: 9.6 G/DL — LOW (ref 11.5–15.5)
IMM GRANULOCYTES NFR BLD AUTO: 0.5 % — SIGNIFICANT CHANGE UP (ref 0–1.5)
LYMPHOCYTES # BLD AUTO: 15.5 % — SIGNIFICANT CHANGE UP (ref 13–44)
LYMPHOCYTES # BLD AUTO: 2.09 K/UL — SIGNIFICANT CHANGE UP (ref 1–3.3)
MCHC RBC-ENTMCNC: 34.5 PG — HIGH (ref 27–34)
MCHC RBC-ENTMCNC: 35.7 GM/DL — SIGNIFICANT CHANGE UP (ref 32–36)
MCV RBC AUTO: 96.8 FL — SIGNIFICANT CHANGE UP (ref 80–100)
MONOCYTES # BLD AUTO: 0.55 K/UL — SIGNIFICANT CHANGE UP (ref 0–0.9)
MONOCYTES NFR BLD AUTO: 4.1 % — SIGNIFICANT CHANGE UP (ref 2–14)
NEUTROPHILS # BLD AUTO: 10.49 K/UL — HIGH (ref 1.8–7.4)
NEUTROPHILS NFR BLD AUTO: 77.9 % — HIGH (ref 43–77)
NRBC # BLD: 0 /100 WBCS — SIGNIFICANT CHANGE UP (ref 0–0)
PLATELET # BLD AUTO: 132 K/UL — LOW (ref 150–400)
RBC # BLD: 2.78 M/UL — LOW (ref 3.8–5.2)
RBC # FLD: 12.4 % — SIGNIFICANT CHANGE UP (ref 10.3–14.5)
WBC # BLD: 13.47 K/UL — HIGH (ref 3.8–10.5)
WBC # FLD AUTO: 13.47 K/UL — HIGH (ref 3.8–10.5)

## 2022-01-08 PROCEDURE — 86901 BLOOD TYPING SEROLOGIC RH(D): CPT

## 2022-01-08 PROCEDURE — G0463: CPT

## 2022-01-08 PROCEDURE — 36415 COLL VENOUS BLD VENIPUNCTURE: CPT

## 2022-01-08 PROCEDURE — 82962 GLUCOSE BLOOD TEST: CPT

## 2022-01-08 PROCEDURE — 85027 COMPLETE CBC AUTOMATED: CPT

## 2022-01-08 PROCEDURE — 59050 FETAL MONITOR W/REPORT: CPT

## 2022-01-08 PROCEDURE — 86850 RBC ANTIBODY SCREEN: CPT

## 2022-01-08 PROCEDURE — 59025 FETAL NON-STRESS TEST: CPT

## 2022-01-08 PROCEDURE — 86769 SARS-COV-2 COVID-19 ANTIBODY: CPT

## 2022-01-08 PROCEDURE — 85025 COMPLETE CBC W/AUTO DIFF WBC: CPT

## 2022-01-08 PROCEDURE — 86780 TREPONEMA PALLIDUM: CPT

## 2022-01-08 PROCEDURE — 86900 BLOOD TYPING SEROLOGIC ABO: CPT

## 2022-01-08 RX ORDER — LANOLIN
1 OINTMENT (GRAM) TOPICAL
Qty: 0 | Refills: 0 | DISCHARGE
Start: 2022-01-08

## 2022-01-08 RX ORDER — IBUPROFEN 200 MG
1 TABLET ORAL
Qty: 0 | Refills: 0 | DISCHARGE
Start: 2022-01-08

## 2022-01-08 RX ORDER — BENZOCAINE 10 %
1 GEL (GRAM) MUCOUS MEMBRANE
Qty: 0 | Refills: 0 | DISCHARGE
Start: 2022-01-08

## 2022-01-08 RX ORDER — HYDROCORTISONE 1 %
1 OINTMENT (GRAM) TOPICAL
Qty: 0 | Refills: 0 | DISCHARGE
Start: 2022-01-08

## 2022-01-08 RX ORDER — AER TRAVELER 0.5 G/1
1 SOLUTION RECTAL; TOPICAL
Qty: 0 | Refills: 0 | DISCHARGE
Start: 2022-01-08

## 2022-01-08 RX ORDER — SIMETHICONE 80 MG/1
1 TABLET, CHEWABLE ORAL
Qty: 0 | Refills: 0 | DISCHARGE
Start: 2022-01-08

## 2022-01-08 RX ORDER — ACETAMINOPHEN 500 MG
3 TABLET ORAL
Qty: 0 | Refills: 0 | DISCHARGE
Start: 2022-01-08

## 2022-01-08 RX ADMIN — LORATADINE 10 MILLIGRAM(S): 10 TABLET ORAL at 13:45

## 2022-01-08 RX ADMIN — Medication 600 MILLIGRAM(S): at 12:05

## 2022-01-08 RX ADMIN — Medication 975 MILLIGRAM(S): at 04:00

## 2022-01-08 RX ADMIN — Medication 600 MILLIGRAM(S): at 06:08

## 2022-01-08 RX ADMIN — Medication 975 MILLIGRAM(S): at 10:05

## 2022-01-08 RX ADMIN — Medication 1 TABLET(S): at 12:05

## 2022-01-08 RX ADMIN — Medication 600 MILLIGRAM(S): at 00:12

## 2022-01-08 RX ADMIN — BENZOCAINE AND MENTHOL 1 LOZENGE: 5; 1 LIQUID ORAL at 06:08

## 2022-01-08 RX ADMIN — Medication 975 MILLIGRAM(S): at 03:28

## 2022-01-08 RX ADMIN — Medication 600 MILLIGRAM(S): at 00:40

## 2022-01-08 RX ADMIN — OXYCODONE HYDROCHLORIDE 5 MILLIGRAM(S): 5 TABLET ORAL at 04:00

## 2022-01-08 RX ADMIN — Medication 25 MILLIGRAM(S): at 03:36

## 2022-01-08 RX ADMIN — Medication 600 MILLIGRAM(S): at 13:45

## 2022-01-08 RX ADMIN — Medication 975 MILLIGRAM(S): at 09:10

## 2022-01-08 RX ADMIN — OXYCODONE HYDROCHLORIDE 5 MILLIGRAM(S): 5 TABLET ORAL at 03:36

## 2022-01-08 NOTE — PROGRESS NOTE ADULT - ASSESSMENT
A/P:  36y  PPD #2     S/P        doing well      Current Issues: sore throat  IPT- afebrile, s/p Unasyn,  continue to monitor vitals  PAST MEDICAL & SURGICAL HISTORY:  Insomnia    Gestational diabetes    No significant past surgical history

## 2022-01-08 NOTE — PROGRESS NOTE ADULT - PROBLEM SELECTOR PLAN 1
Increase OOB  Regular diet  PO Pain protocol  Sore throat - pt afebrile since delivery, claritin and cepacol ordered for patient  Routine Postpartum Care      Yen Chun PA-C
`Regular diet  PO Pain protocol  reg diet  Sore throat - pt afebrile since delivery, claritin and cepacol ordered for patient  Routine Postpartum Care

## 2022-01-08 NOTE — DISCHARGE NOTE OB - PATIENT PORTAL LINK FT
You can access the FollowMyHealth Patient Portal offered by [facility] by registering at the following website: [email or web address]. By joining [facility]’s FollowMyHealth portal, you will also be able to view your health information using other applications (apps) compatible with our system.

## 2022-01-08 NOTE — PROGRESS NOTE ADULT - ATTENDING COMMENTS
I personally saw and evaluated pt and agree with JINNY Norton's assessment and plan.   Pt is doing well on PPD 2 after vaginal delivery.  Only complaint is sore throat but has been afebrile.  Will conitnue routine PP care  discharge planning
Ob Attg Note:  Pt is doing well.  I agree w/ the daily note entered today, and the plan of care.

## 2022-01-08 NOTE — DISCHARGE NOTE OB - CARE PLAN
1 Principal Discharge DX:	Normal vaginal delivery  Assessment and plan of treatment:	diet and activities as discussed and tolerated.  please call office to schedule postpartum visit 5 weeks after delivery or earlier if needed

## 2022-01-08 NOTE — DISCHARGE NOTE OB - MEDICATION SUMMARY - MEDICATIONS TO TAKE
I will START or STAY ON the medications listed below when I get home from the hospital:    ibuprofen 600 mg oral tablet  -- 1 tab(s) by mouth every 6 hours  -- Indication: For pain    acetaminophen 325 mg oral tablet  -- 3 tab(s) by mouth every 6 hours  -- Indication: For pain    benzocaine 20% topical spray  -- 1 spray(s) on skin every 6 hours, As needed, for Perineal discomfort  -- Indication: For perineal discomfort    hydrocortisone 1% topical cream  -- 1 application on skin every 6 hours, As needed, Moderate Pain (4-6)  -- Indication: For perineal discomfort    lanolin topical ointment  -- 1 application on skin every 6 hours, As needed, nipple soreness  -- Indication: For Nipple soreness    witch hazel 50% topical pad  -- 1 application on skin every 4 hours, As needed, Perineal discomfort  -- Indication: For perineal discomfort    Prenatal Multivitamins with Folic Acid 1 mg oral tablet  -- 1 tab(s) by mouth once a day  -- Indication: For postpartum    simethicone 80 mg oral tablet, chewable  -- 1 tab(s) by mouth every 4 hours, As needed, Gas  -- Indication: For gas pain

## 2022-01-08 NOTE — DISCHARGE NOTE OB - PLAN OF CARE
diet and activities as discussed and tolerated.  please call office to schedule postpartum visit 5 weeks after delivery or earlier if needed

## 2022-01-08 NOTE — PROGRESS NOTE ADULT - SUBJECTIVE AND OBJECTIVE BOX
Postpartum Note- PPD#2    No Known Allergies  Blood Type AB   Positive  RPR : Negative  Rubella: Immune    S:Patient is a  36y      P 1     PPD#2        S/P     Pt c/o pain in lower back/ coccyx area.   Denies fevers/ chills.   Pt is OOB, tolerating PO, passing flatus. Lochia WNL.     Feeding: Bottle    O:  Vital Signs Last 24 Hrs  T(C): 36.7 (2022 05:00), Max: 38.5 (2022 18:43)  T(F): 98 (2022 05:00), Max: 101.3 (2022 18:43)  HR: 81 (2022 05:00) (61 - 153)  BP: 118/71 (2022 05:00) (96/52 - 190/117)  RR: 17 (2022 05:00) (17 - 18)  SpO2: 96% (2022 05:00) (81% - 100%)     Gen: NAD  Abdomen: Soft, nontender, non-distended, fundus firm.  Vaginal: Lochia WNL  Back- (+) mildly TTP over coccyx  Ext: Neg calf tenderness    LABS:                        10.2   18.69 )-----------( 121      ( 2022 12:42 )             29.6

## 2022-01-08 NOTE — DISCHARGE NOTE OB - HOSPITAL COURSE
Pt underwent an uncomplicated vaginal delivery, please see delivery summary for details  Pt did well postpartum.  She was voiding, ambulating and tolerating regular diet.  Her pain is well controlled and she remained afebrile.  Pt was discharged on PPD 2 in stable condition.

## 2022-01-08 NOTE — DISCHARGE NOTE OB - NS MD DC FALL RISK RISK
For information on Fall & Injury Prevention, visit: [email or web address] OR  [email or web address] OR  https://www.cdc.gov/steadi/patient.html

## 2022-01-08 NOTE — DISCHARGE NOTE OB - CARE PROVIDER_API CALL
Savita Perez)  OBSGYN  General  [address] [place] [address], [address] [place] [address]  Phone: [phone]  Fax: [phone]  Follow Up Time:

## 2022-01-14 ENCOUNTER — NON-APPOINTMENT (OUTPATIENT)
Age: 37
End: 2022-01-14

## 2022-01-18 ENCOUNTER — TRANSCRIPTION ENCOUNTER (OUTPATIENT)
Age: 37
End: 2022-01-18

## 2022-03-28 ENCOUNTER — RX RENEWAL (OUTPATIENT)
Age: 37
End: 2022-03-28

## 2022-09-09 NOTE — OB RN TRIAGE NOTE - CHIEF COMPLAINT QUOTE
Anuja Gloria her biopsies were negative I am feeling cramping all over ( less now) and my baby wasn't moving as much

## 2024-08-26 NOTE — OB PROVIDER IHI INDUCTION/AUGMENTATION NOTE - NS_OBIHIREPANPSATTEST_OBGYN_ALL_OB
I have discussed with the Attending the patient's status, as well as the H&P and the fetal status. The Attending agreed with the suggested management.
Unable to care for self